# Patient Record
Sex: FEMALE | Race: BLACK OR AFRICAN AMERICAN
[De-identification: names, ages, dates, MRNs, and addresses within clinical notes are randomized per-mention and may not be internally consistent; named-entity substitution may affect disease eponyms.]

---

## 2020-09-14 ENCOUNTER — TRANSCRIPTION ENCOUNTER (OUTPATIENT)
Age: 65
End: 2020-09-14

## 2020-12-02 DIAGNOSIS — Z01.818 ENCOUNTER FOR OTHER PREPROCEDURAL EXAMINATION: ICD-10-CM

## 2020-12-02 PROBLEM — Z00.00 ENCOUNTER FOR PREVENTIVE HEALTH EXAMINATION: Status: ACTIVE | Noted: 2020-12-02

## 2020-12-03 ENCOUNTER — APPOINTMENT (OUTPATIENT)
Dept: DISASTER EMERGENCY | Facility: CLINIC | Age: 65
End: 2020-12-03

## 2020-12-04 LAB — SARS-COV-2 N GENE NPH QL NAA+PROBE: NOT DETECTED

## 2021-03-20 ENCOUNTER — APPOINTMENT (OUTPATIENT)
Dept: DISASTER EMERGENCY | Facility: CLINIC | Age: 66
End: 2021-03-20

## 2021-03-21 LAB — SARS-COV-2 N GENE NPH QL NAA+PROBE: NOT DETECTED

## 2021-10-24 ENCOUNTER — APPOINTMENT (OUTPATIENT)
Dept: DISASTER EMERGENCY | Facility: CLINIC | Age: 66
End: 2021-10-24

## 2021-10-25 LAB — SARS-COV-2 N GENE NPH QL NAA+PROBE: NOT DETECTED

## 2022-04-07 ENCOUNTER — APPOINTMENT (OUTPATIENT)
Dept: MRI IMAGING | Facility: CLINIC | Age: 67
End: 2022-04-07
Payer: MEDICARE

## 2022-04-07 PROCEDURE — 73721 MRI JNT OF LWR EXTRE W/O DYE: CPT | Mod: RT,MH

## 2022-04-29 ENCOUNTER — APPOINTMENT (OUTPATIENT)
Dept: PAIN MANAGEMENT | Facility: CLINIC | Age: 67
End: 2022-04-29
Payer: MEDICARE

## 2022-04-29 PROCEDURE — 64484 NJX AA&/STRD TFRM EPI L/S EA: CPT

## 2022-04-29 PROCEDURE — 64483 NJX AA&/STRD TFRM EPI L/S 1: CPT

## 2022-04-29 NOTE — PROCEDURE
[FreeTextEntry3] : Date of Service: 04/29/2022 \par \par Account: [id number] \par \par Patient: MUSTAPHA MCKENZIE \par \par YOB: 1955 \par \par Age: 67 year \par \par Surgeon:                                                        Hira Zelaya M.D.\par \par Assistant:                                                       None.\par \par Pre-Operative Diagnosis:                            Lumbosacral Radiculitis (M54.17)     \par \par Post Operative Diagnosis:                          Lumbosacral Radiculitis (M54.17)\par \par Procedure:                                                     Right L4-5, L5-S1 transformational epidural steroid injection under fluoroscopic guidance. \par \par Anesthesia:                                                    MAC\par \par \par This procedure was carried out using fluoroscopic guidance.  The risks and benefits of the procedure were discussed extensively with the patient.  The consent of the patient was obtained and the following procedure was performed. The patient was placed in the prone position on the fluoroscopic table and the lumbar area was prepped and draped in a sterile fashion.\par \par The right L4-5 and L5-S1 neural foramen were identified on right oblique  "gus dog" anatomical view at the 6 o' clock position using fluoroscopic guidance, and the area was marked. The overlying skin and subcutaneous structures were anesthetized using sterile technique with 1% Lidocaine.  A 22 gauge spinal needle was directed toward the inferior (6 o'clock) position of the pedicle, which formed the roof of the identified foramen.  Once in the epidural space, after negative aspiration for heme and CSF, 1cc of Omnipaque contrast was injected to confirm epidural location and assess filling defects and rule out intravascular needle placement. \par \par The following contrast flow and epidurogram was observed: no intravascular or intrathecal flow pattern was noted.  No blood or CSF was aspirated. Omnipaque spread appeared to outline the right L4 and L5 nerve roots and spread medially into the epidural space.  \par \par After this, an injectate of 3 cc preservative free normal saline plus 40 mg of depo-medrol was injected in the epidural space at each of the two levels.\par \par The needle was subsequently removed.  Vital signs remained normal.  Pulse oximeter was used throughout the procedure and the patient's pulse and oxygen saturation remained within normal limits.  The patient tolerated the procedure well.  There were no complications.  The patient was instructed to apply ice over the injection sites for twenty minutes every two hours for the next 24 to 48 hours.  The patient was also instructed to contact me immediately if there were any problems.\par \nubia Zelaya M.D.\par

## 2022-05-13 ENCOUNTER — APPOINTMENT (OUTPATIENT)
Dept: PAIN MANAGEMENT | Facility: CLINIC | Age: 67
End: 2022-05-13
Payer: MEDICARE

## 2022-05-13 VITALS — HEIGHT: 62 IN | BODY MASS INDEX: 36.44 KG/M2 | WEIGHT: 198 LBS

## 2022-05-13 PROCEDURE — 99214 OFFICE O/P EST MOD 30 MIN: CPT

## 2022-05-13 NOTE — HISTORY OF PRESENT ILLNESS
[Lower back] : lower back [Dull/Aching] : dull/aching [Sharp] : sharp [Shooting] : shooting [Frequent] : frequent [Household chores] : household chores [Social interactions] : social interactions [Meds] : meds [Ice] : ice [Injection therapy] : injection therapy [Walking] : walking [Exercising] : exercising [Stairs] : stairs [] : no

## 2022-05-13 NOTE — PROCEDURE
[FreeTextEntry3] : Date of Service: 04/29/2022 \par \par Account: [id number] \par \par Patient: MUSTAPHA MCKENZIE \par \par YOB: 1955 \par \par Age: 67 year \par \par Surgeon:                                                        Hira Zelaya M.D.\par \par Assistant:                                                       None.\par \par Pre-Operative Diagnosis:                            Lumbosacral Radiculitis (M54.17)     \par \par Post Operative Diagnosis:                          Lumbosacral Radiculitis (M54.17)\par \par Procedure:                                                     Right L4-5, L5-S1 transformational epidural steroid injection under fluoroscopic guidance. \par \par Anesthesia:                                                    MAC\par \par \par This procedure was carried out using fluoroscopic guidance.  The risks and benefits of the procedure were discussed extensively with the patient.  The consent of the patient was obtained and the following procedure was performed. The patient was placed in the prone position on the fluoroscopic table and the lumbar area was prepped and draped in a sterile fashion.\par \par The right L4-5 and L5-S1 neural foramen were identified on right oblique  "gus dog" anatomical view at the 6 o' clock position using fluoroscopic guidance, and the area was marked. The overlying skin and subcutaneous structures were anesthetized using sterile technique with 1% Lidocaine.  A 22 gauge spinal needle was directed toward the inferior (6 o'clock) position of the pedicle, which formed the roof of the identified foramen.  Once in the epidural space, after negative aspiration for heme and CSF, 1cc of Omnipaque contrast was injected to confirm epidural location and assess filling defects and rule out intravascular needle placement. \par \par The following contrast flow and epidurogram was observed: no intravascular or intrathecal flow pattern was noted.  No blood or CSF was aspirated. Omnipaque spread appeared to outline the right L4 and L5 nerve roots and spread medially into the epidural space.  \par \par After this, an injectate of 3 cc preservative free normal saline plus 40 mg of depo-medrol was injected in the epidural space at each of the two levels.\par \par The needle was subsequently removed.  Vital signs remained normal.  Pulse oximeter was used throughout the procedure and the patient's pulse and oxygen saturation remained within normal limits.  The patient tolerated the procedure well.  There were no complications.  The patient was instructed to apply ice over the injection sites for twenty minutes every two hours for the next 24 to 48 hours.  The patient was also instructed to contact me immediately if there were any problems.\par \nubia Zelaya M.D.\par

## 2022-05-13 NOTE — ASSESSMENT
[FreeTextEntry1] : 60% relief with TFESI with improved ROM and ADLS recommend repeat for cumulative relief

## 2022-06-23 ENCOUNTER — APPOINTMENT (OUTPATIENT)
Dept: PAIN MANAGEMENT | Facility: CLINIC | Age: 67
End: 2022-06-23

## 2022-06-23 PROCEDURE — 64483 NJX AA&/STRD TFRM EPI L/S 1: CPT | Mod: RT

## 2022-06-23 PROCEDURE — 64484 NJX AA&/STRD TFRM EPI L/S EA: CPT | Mod: RT

## 2022-06-23 NOTE — PROCEDURE
[FreeTextEntry3] : Date of Service: 06/23/2022 \par \par Account: [id number] \par \par Patient: MUSTAPHA MCKENZIE \par \par YOB: 1955 \par \par Age: 67 year \par \par Surgeon:                                                        Hira Zelaya M.D.\par \par Assistant:                                                       None.\par \par Pre-Operative Diagnosis:                            Lumbosacral Radiculitis (M54.17)     \par \par Post Operative Diagnosis:                          Lumbosacral Radiculitis (M54.17)\par \par Procedure:                                                     Right L4-5, L5-S1 transformational epidural steroid injection under fluoroscopic guidance. \par \par Anesthesia:                                                    MAC\par \par \par This procedure was carried out using fluoroscopic guidance.  The risks and benefits of the procedure were discussed extensively with the patient.  The consent of the patient was obtained and the following procedure was performed. The patient was placed in the prone position on the fluoroscopic table and the lumbar area was prepped and draped in a sterile fashion.\par \par The right L4-5 and L5-S1 neural foramen were identified on right oblique  "gus dog" anatomical view at the 6 o' clock position using fluoroscopic guidance, and the area was marked. The overlying skin and subcutaneous structures were anesthetized using sterile technique with 1% Lidocaine.  A 22 gauge spinal needle was directed toward the inferior (6 o'clock) position of the pedicle, which formed the roof of the identified foramen.  Once in the epidural space, after negative aspiration for heme and CSF, 1cc of Omnipaque contrast was injected to confirm epidural location and assess filling defects and rule out intravascular needle placement. \par \par The following contrast flow and epidurogram was observed: no intravascular or intrathecal flow pattern was noted.  No blood or CSF was aspirated. Omnipaque spread appeared to outline the right L4 and L5 nerve roots and spread medially into the epidural space.  \par \par After this, an injectate of 3 cc preservative free normal saline plus 40 mg of depo-medrol was injected in the epidural space at each of the two levels.\par \par The needle was subsequently removed.  Vital signs remained normal.  Pulse oximeter was used throughout the procedure and the patient's pulse and oxygen saturation remained within normal limits.  The patient tolerated the procedure well.  There were no complications.  The patient was instructed to apply ice over the injection sites for twenty minutes every two hours for the next 24 to 48 hours.  The patient was also instructed to contact me immediately if there were any problems.\par \nubia Zelaya M.D.\par

## 2022-07-08 ENCOUNTER — APPOINTMENT (OUTPATIENT)
Dept: PAIN MANAGEMENT | Facility: CLINIC | Age: 67
End: 2022-07-08

## 2022-07-08 VITALS — HEIGHT: 62 IN | BODY MASS INDEX: 37.36 KG/M2 | WEIGHT: 203 LBS

## 2022-07-08 PROCEDURE — 99214 OFFICE O/P EST MOD 30 MIN: CPT

## 2022-07-08 NOTE — HISTORY OF PRESENT ILLNESS
[Lower back] : lower back [Dull/Aching] : dull/aching [Household chores] : household chores [Social interactions] : social interactions [Rest] : rest [Meds] : meds [Injection therapy] : injection therapy [Walking] : walking [Bending forward] : bending forward [Exercising] : exercising [Stairs] : stairs [] : no

## 2022-07-08 NOTE — DISCUSSION/SUMMARY
[Surgical risks reviewed] : Surgical risks reviewed [de-identified] : Her current pain is worse with standing and sitting, improves with walking but then after significant distance ( 1 block or so) then she starts to have worsening pain. Denies groin pain. \par \par After discussing various treatment options with the patient including but not limited to oral medications, physical therapy, exercise,\par modalities as well as interventional spinal injections, we have decided with the following plan\par \par I personally reviewed the MRI/CT scan images and agree with the radiologist's report. The\par radiological findings were discussed with the patient.\par \par \par The risks, benefits, contents and alternatives to injection were explained in full to the patient. Risks outlined include but are not\par limited to infection,sepsis, bleeding, post-dural puncture headache, nerve damage, temporary increase in pain, syncopal episode,\par failure to resolve symptoms, allergic reaction, symptom recurrence, and elevation of blood sugar in diabetics. Cortisone may cause\par immunosuppression. Patient understands the risks. All questions were answered. After discussion of options, patient requested\par an injection. Information regarding the injection was given to the patient. Which medications to stop prior to the injection was\par explained to the patient as well.\par \par Follow up in 1-2 weeks post injection for re-evaluation.\par \par  Conservative Care\par Continue Home exercises, stretching, activity modification, physical therapy, and conservative care.\par \par Proceed with R 4/5 TFESI and consider surgical consult if no relief. \par

## 2022-07-08 NOTE — ASSESSMENT
[FreeTextEntry1] : Pt reports approx. 80% relief from injection, she still has some anterior thigh pain, DVT she states was ruled out. She is walking better and able to function far better than prior to injection.

## 2022-07-08 NOTE — PHYSICAL EXAM
[Normal Coordination] : normal coordination [Normal DTR UE/LE] : normal DTR UE/LE  [Normal Sensation] : normal sensation [Normal Mood and Affect] : normal mood and affect [Orientated] : orientated [Able to Communicate] : able to communicate [Normal Skin] : normal skin [No Rash] : no rash [No Ulcers] : no ulcers [No Lesions] : no lesions [No obvious lymphadenopathy in areas examined] : no obvious lymphadenopathy in areas examined [Well Developed] : well developed [Well Nourished] : well nourished [No Respiratory Distress] : no respiratory distress [] : light touch intact throughout both lower extremities

## 2022-07-26 LAB — SARS-COV-2 N GENE NPH QL NAA+PROBE: NOT DETECTED

## 2022-07-27 ENCOUNTER — OFFICE (OUTPATIENT)
Dept: URBAN - METROPOLITAN AREA CLINIC 12 | Facility: CLINIC | Age: 67
Setting detail: OPHTHALMOLOGY
End: 2022-07-27
Payer: MEDICARE

## 2022-07-27 DIAGNOSIS — H43.822: ICD-10-CM

## 2022-07-27 DIAGNOSIS — E11.9: ICD-10-CM

## 2022-07-27 DIAGNOSIS — H25.13: ICD-10-CM

## 2022-07-27 PROCEDURE — 92134 CPTRZ OPH DX IMG PST SGM RTA: CPT | Performed by: OPHTHALMOLOGY

## 2022-07-27 PROCEDURE — 99204 OFFICE O/P NEW MOD 45 MIN: CPT | Performed by: OPHTHALMOLOGY

## 2022-07-27 ASSESSMENT — KERATOMETRY
OD_K2POWER_DIOPTERS: 42.50
OD_K1POWER_DIOPTERS: 42.25
OS_K1POWER_DIOPTERS: 42.00
OS_AXISANGLE_DEGREES: 11
OD_AXISANGLE_DEGREES: 116
OS_K2POWER_DIOPTERS: 42.25

## 2022-07-27 ASSESSMENT — AXIALLENGTH_DERIVED
OS_AL: 24.9501
OD_AL: 25.4001
OD_AL: 25.4001
OS_AL: 24.9501

## 2022-07-27 ASSESSMENT — CONFRONTATIONAL VISUAL FIELD TEST (CVF)
OD_FINDINGS: FULL
OS_FINDINGS: FULL

## 2022-07-27 ASSESSMENT — REFRACTION_MANIFEST
OS_VA1: 20/30-2
OS_SPHERE: -1.25
OD_AXIS: 96
OS_AXIS: 80
OD_SPHERE: -3.00
OS_CYLINDER: -1.50
OD_VA1: 20/40-2
OD_CYLINDER: -0.50

## 2022-07-27 ASSESSMENT — REFRACTION_CURRENTRX
OD_CYLINDER: -0.50
OS_VPRISM_DIRECTION: BF
OS_OVR_VA: 20/
OD_SPHERE: -0.75
OS_AXIS: 94
OD_OVR_VA: 20/
OS_SPHERE: -0.50
OS_CYLINDER: -0.75
OD_ADD: +2.00
OD_AXIS: 106
OS_ADD: +2.00
OD_VPRISM_DIRECTION: BF

## 2022-07-27 ASSESSMENT — SPHEQUIV_DERIVED
OS_SPHEQUIV: -2
OD_SPHEQUIV: -3.25
OS_SPHEQUIV: -2
OD_SPHEQUIV: -3.25

## 2022-07-27 ASSESSMENT — VISUAL ACUITY
OD_BCVA: 20/70
OS_BCVA: 20/150

## 2022-07-27 ASSESSMENT — REFRACTION_AUTOREFRACTION
OD_CYLINDER: -0.50
OD_AXIS: 96
OS_AXIS: 80
OS_CYLINDER: -1.50
OD_SPHERE: -3.00
OS_SPHERE: -1.25

## 2022-07-27 ASSESSMENT — TONOMETRY
OS_IOP_MMHG: 17
OD_IOP_MMHG: 17

## 2022-07-29 ENCOUNTER — APPOINTMENT (OUTPATIENT)
Dept: PAIN MANAGEMENT | Facility: CLINIC | Age: 67
End: 2022-07-29

## 2022-07-29 PROCEDURE — 64483 NJX AA&/STRD TFRM EPI L/S 1: CPT | Mod: RT

## 2022-07-29 NOTE — PROCEDURE
[FreeTextEntry3] : Date of Service: 07/29/2022 \par \par Account: [id number] \par \par Patient: MUSTAPHA MCKENZIE \par \par YOB: 1955 \par \par Age: 67 year \par \par Surgeon:                                                         Hira Zelaya M.D.\par \par Assistant:                                                        None.\par \par Pre-Operative Diagnosis:                             Lumbosacral Radiculitis (M54.17)\par \par Post Operative Diagnosis:                            Lumbosacral Radiculitis (M54.17)\par \par Procedure:                                                       Right L4-5 transforaminal epidural steroid injection under fluoroscopic guidance. \par \par Anesthesia:                                                      MAC\par \par \par This procedure was carried out using fluoroscopic guidance.  The risks and benefits of the procedure were discussed extensively with the patient.  The consent of the patient was obtained and the following procedure was performed. The patient was placed in the prone position on the fluoroscopic table and the lumbar area was prepped and draped in a sterile fashion.\par \par The right L4-5 neural foramen was identified on right oblique  "gus dog" anatomical view at the 6 o' clock position using fluoroscopic guidance, and the area was marked. The overlying skin and subcutaneous structures were anesthetized using sterile technique with 1% Lidocaine.  A 22 gauge spinal needle was directed toward the inferior (6o'clock) position of the pedicle, which formed the roof of the identified foramen.  Once in the epidural space, after negative aspiration for heme and CSF, 1cc of Omnipaque contrast was injected to confirm epidural location and assess filling defects and rule out intravascular needle placement. \par \par The following contrast flow and epidurogram was observed: no intravascular or intrathecal flow pattern was noted.  No blood or CSF was aspirated. Omnipaque spread appeared to outline the right L4 nerve root and spread medially into the epidural space.  \par \par  After this, an injectate of 3 cc preservative free normal saline plus 80 mg of depo-medrol was injected in the epidural space at that level.\par \par The needle was subsequently removed.  Vital signs remained normal.  Pulse oximeter was used throughout the procedure and the patient's pulse and oxygen saturation remained within normal limits.  The patient tolerated the procedure well.  There were no complications.  The patient was instructed to apply ice over the injection sites for twenty minutes every two hours for the next 24 to 48 hours.  The patient was also instructed to contact me immediately if there were any problems.\par \nubia Zelaya M.D.\par

## 2022-08-12 ENCOUNTER — APPOINTMENT (OUTPATIENT)
Dept: PAIN MANAGEMENT | Facility: CLINIC | Age: 67
End: 2022-08-12

## 2022-08-12 VITALS — BODY MASS INDEX: 37.91 KG/M2 | WEIGHT: 206 LBS | HEIGHT: 62 IN

## 2022-08-12 DIAGNOSIS — G89.29 LOW BACK PAIN, UNSPECIFIED: ICD-10-CM

## 2022-08-12 DIAGNOSIS — M54.50 LOW BACK PAIN, UNSPECIFIED: ICD-10-CM

## 2022-08-12 DIAGNOSIS — M54.12 RADICULOPATHY, CERVICAL REGION: ICD-10-CM

## 2022-08-12 DIAGNOSIS — M54.16 RADICULOPATHY, LUMBAR REGION: ICD-10-CM

## 2022-08-12 DIAGNOSIS — M54.2 CERVICALGIA: ICD-10-CM

## 2022-08-12 DIAGNOSIS — M54.17 RADICULOPATHY, LUMBOSACRAL REGION: ICD-10-CM

## 2022-08-12 PROCEDURE — 99214 OFFICE O/P EST MOD 30 MIN: CPT

## 2022-08-12 NOTE — ASSESSMENT
[FreeTextEntry1] : S/P R L4/5 TFESI. Pt reports approx. 50-60% relief will proceed with repeat injection and continue HEP. Will change steroid to triamcinolone at next procedure.

## 2022-08-12 NOTE — PHYSICAL EXAM
[Normal Coordination] : normal coordination [Normal DTR UE/LE] : normal DTR UE/LE  [Normal Sensation] : normal sensation [Normal Mood and Affect] : normal mood and affect [Able to Communicate] : able to communicate [Normal Skin] : normal skin [No Rash] : no rash [No Ulcers] : no ulcers [No Lesions] : no lesions [No obvious lymphadenopathy in areas examined] : no obvious lymphadenopathy in areas examined [Well Developed] : well developed [Well Nourished] : well nourished [No Respiratory Distress] : no respiratory distress [] : light touch intact throughout both lower extremities

## 2022-08-12 NOTE — DISCUSSION/SUMMARY
[Surgical risks reviewed] : Surgical risks reviewed [de-identified] : After discussing various treatment options with the patient including but not limited to oral medications, physical therapy, exercise,\par modalities as well as interventional spinal injections, we have decided with the following plan\par \par I personally reviewed the MRI/CT scan images and agree with the radiologist's report. The\par radiological findings were discussed with the patient.\par \par \par The risks, benefits, contents and alternatives to injection were explained in full to the patient. Risks outlined include but are not\par limited to infection,sepsis, bleeding, post-dural puncture headache, nerve damage, temporary increase in pain, syncopal episode,\par failure to resolve symptoms, allergic reaction, symptom recurrence, and elevation of blood sugar in diabetics. Cortisone may cause\par immunosuppression. Patient understands the risks. All questions were answered. After discussion of options, patient requested\par an injection. Information regarding the injection was given to the patient. Which medications to stop prior to the injection was\par explained to the patient as well.\par \par Follow up in 1-2 weeks post injection for re-evaluation.\par \par  Conservative Care\par Continue Home exercises, stretching, activity modification, physical therapy, and conservative care.\par \par Proceed with R L4/5 TFESI with triamcinolone\par \par \par Still with some groin pain, R hip MRI done, will obtain surgical input. \par \par

## 2022-08-12 NOTE — HISTORY OF PRESENT ILLNESS
[Lower back] : lower back [Frequent] : frequent [Leisure] : leisure [Social interactions] : social interactions [Rest] : rest [Injection therapy] : injection therapy [Walking] : walking [Bending forward] : bending forward [Exercising] : exercising [Stairs] : stairs [] : no

## 2022-09-14 ENCOUNTER — OFFICE (OUTPATIENT)
Dept: URBAN - METROPOLITAN AREA CLINIC 12 | Facility: CLINIC | Age: 67
Setting detail: OPHTHALMOLOGY
End: 2022-09-14
Payer: MEDICARE

## 2022-09-14 DIAGNOSIS — H25.11: ICD-10-CM

## 2022-09-14 DIAGNOSIS — H25.13: ICD-10-CM

## 2022-09-14 PROCEDURE — 92136 OPHTHALMIC BIOMETRY: CPT | Performed by: OPHTHALMOLOGY

## 2022-09-14 PROCEDURE — 99213 OFFICE O/P EST LOW 20 MIN: CPT | Performed by: OPHTHALMOLOGY

## 2022-09-14 ASSESSMENT — TONOMETRY
OD_IOP_MMHG: 15
OS_IOP_MMHG: 15

## 2022-09-14 ASSESSMENT — KERATOMETRY
METHOD_AUTO_MANUAL: AUTO
OS_AXISANGLE_DEGREES: 057
OS_K2POWER_DIOPTERS: 43.00
OD_K2POWER_DIOPTERS: 42.50
OS_K1POWER_DIOPTERS: 42.50
OD_K1POWER_DIOPTERS: 42.25
OD_AXISANGLE_DEGREES: 098

## 2022-09-14 ASSESSMENT — SPHEQUIV_DERIVED
OS_SPHEQUIV: -3.375
OD_SPHEQUIV: -3.25
OS_SPHEQUIV: -2
OD_SPHEQUIV: -3.25

## 2022-09-14 ASSESSMENT — REFRACTION_CURRENTRX
OD_ADD: +2.00
OD_AXIS: 106
OS_VPRISM_DIRECTION: BF
OD_CYLINDER: -0.50
OD_SPHERE: -0.75
OD_VPRISM_DIRECTION: BF
OS_SPHERE: -0.50
OS_CYLINDER: -0.75
OD_OVR_VA: 20/
OS_OVR_VA: 20/
OS_AXIS: 94
OS_ADD: +2.00

## 2022-09-14 ASSESSMENT — REFRACTION_MANIFEST
OD_CYLINDER: -0.50
OS_VA1: 20/30-2
OD_AXIS: 96
OS_CYLINDER: -1.50
OD_VA1: 20/40-2
OD_SPHERE: -3.00
OS_SPHERE: -1.25
OS_AXIS: 80

## 2022-09-14 ASSESSMENT — REFRACTION_AUTOREFRACTION
OS_SPHERE: -2.75
OS_AXIS: 009
OS_CYLINDER: -1.25
OD_CYLINDER: -1.00
OD_SPHERE: -2.75
OD_AXIS: 100

## 2022-09-14 ASSESSMENT — CONFRONTATIONAL VISUAL FIELD TEST (CVF)
OS_FINDINGS: FULL
OD_FINDINGS: FULL

## 2022-09-14 ASSESSMENT — VISUAL ACUITY
OD_BCVA: 20/80
OS_BCVA: 20/150

## 2022-09-14 ASSESSMENT — AXIALLENGTH_DERIVED
OD_AL: 25.4001
OS_AL: 25.2973
OD_AL: 25.4001
OS_AL: 24.696

## 2022-09-26 ENCOUNTER — AMBULATORY SURGERY CENTER (OUTPATIENT)
Dept: URBAN - METROPOLITAN AREA SURGERY 27 | Facility: SURGERY | Age: 67
Setting detail: OPHTHALMOLOGY
End: 2022-09-26
Payer: MEDICARE

## 2022-09-26 DIAGNOSIS — H25.11: ICD-10-CM

## 2022-09-26 PROCEDURE — 68841 INSJ RX ELUT IMPLT LAC CANAL: CPT | Performed by: OPHTHALMOLOGY

## 2022-09-26 PROCEDURE — 66984 XCAPSL CTRC RMVL W/O ECP: CPT | Performed by: OPHTHALMOLOGY

## 2022-09-27 ENCOUNTER — OFFICE (OUTPATIENT)
Dept: URBAN - METROPOLITAN AREA CLINIC 12 | Facility: CLINIC | Age: 67
Setting detail: OPHTHALMOLOGY
End: 2022-09-27
Payer: MEDICARE

## 2022-09-27 ENCOUNTER — RX ONLY (RX ONLY)
Age: 67
End: 2022-09-27

## 2022-09-27 DIAGNOSIS — Z96.1: ICD-10-CM

## 2022-09-27 PROCEDURE — 99024 POSTOP FOLLOW-UP VISIT: CPT | Performed by: OPTOMETRIST

## 2022-09-27 ASSESSMENT — REFRACTION_MANIFEST
OS_AXIS: 80
OS_CYLINDER: -1.50
OS_VA1: 20/30-2
OD_CYLINDER: -0.50
OS_SPHERE: -1.25
OD_AXIS: 96
OD_SPHERE: -3.00
OD_VA1: 20/40-2

## 2022-09-27 ASSESSMENT — CONFRONTATIONAL VISUAL FIELD TEST (CVF)
OD_FINDINGS: FULL
OS_FINDINGS: FULL

## 2022-09-27 ASSESSMENT — REFRACTION_AUTOREFRACTION
OS_SPHERE: -1.75
OD_SPHERE: +0.50
OD_AXIS: 069
OS_CYLINDER: -1.00
OD_CYLINDER: -1.25
OS_AXIS: 082

## 2022-09-27 ASSESSMENT — AXIALLENGTH_DERIVED
OS_AL: 24.8478
OD_AL: 25.5329
OD_AL: 27.0433
OS_AL: 24.9563

## 2022-09-27 ASSESSMENT — KERATOMETRY
OD_K1POWER_DIOPTERS: 33.50
OS_AXISANGLE_DEGREES: 169
OD_K2POWER_DIOPTERS: 44.00
METHOD_AUTO_MANUAL: AUTO
OD_AXISANGLE_DEGREES: 092
OS_K2POWER_DIOPTERS: 42.50
OS_K1POWER_DIOPTERS: 42.25

## 2022-09-27 ASSESSMENT — TONOMETRY
OD_IOP_MMHG: 17
OS_IOP_MMHG: 17

## 2022-09-27 ASSESSMENT — REFRACTION_CURRENTRX
OS_VPRISM_DIRECTION: BF
OD_ADD: +2.00
OD_AXIS: 105
OD_CYLINDER: -0.50
OS_OVR_VA: 20/
OS_ADD: +2.00
OS_SPHERE: -0.50
OD_OVR_VA: 20/
OD_SPHERE: -0.75
OS_AXIS: 095
OD_VPRISM_DIRECTION: BF
OS_CYLINDER: -0.75

## 2022-09-27 ASSESSMENT — SPHEQUIV_DERIVED
OS_SPHEQUIV: -2.25
OD_SPHEQUIV: -3.25
OD_SPHEQUIV: -0.125
OS_SPHEQUIV: -2

## 2022-09-27 ASSESSMENT — VISUAL ACUITY
OD_BCVA: 20/70-
OS_BCVA: 20/50

## 2022-10-05 ENCOUNTER — OFFICE (OUTPATIENT)
Dept: URBAN - METROPOLITAN AREA CLINIC 12 | Facility: CLINIC | Age: 67
Setting detail: OPHTHALMOLOGY
End: 2022-10-05
Payer: MEDICARE

## 2022-10-05 DIAGNOSIS — H25.12: ICD-10-CM

## 2022-10-05 PROCEDURE — 92136 OPHTHALMIC BIOMETRY: CPT | Performed by: OPHTHALMOLOGY

## 2022-10-05 ASSESSMENT — SPHEQUIV_DERIVED
OS_SPHEQUIV: -2.25
OD_SPHEQUIV: -3.25
OD_SPHEQUIV: 0.125
OS_SPHEQUIV: -2

## 2022-10-05 ASSESSMENT — REFRACTION_CURRENTRX
OD_AXIS: 105
OS_OVR_VA: 20/
OD_CYLINDER: -0.50
OS_SPHERE: -0.50
OD_VPRISM_DIRECTION: BF
OS_VPRISM_DIRECTION: BF
OS_ADD: +2.00
OD_ADD: +2.00
OS_CYLINDER: -0.75
OD_OVR_VA: 20/
OD_SPHERE: -0.75
OS_AXIS: 077

## 2022-10-05 ASSESSMENT — REFRACTION_MANIFEST
OD_VA1: 20/40-2
OD_SPHERE: -3.00
OS_AXIS: 80
OS_CYLINDER: -1.50
OD_AXIS: 96
OD_CYLINDER: -0.50
OS_VA1: 20/30-2
OS_SPHERE: -1.25

## 2022-10-05 ASSESSMENT — KERATOMETRY
OD_K2POWER_DIOPTERS: 42.25
OD_K1POWER_DIOPTERS: 41.75
METHOD_AUTO_MANUAL: AUTO
OS_AXISANGLE_DEGREES: 090
OS_K1POWER_DIOPTERS: 42.25
OD_AXISANGLE_DEGREES: 154
OS_K2POWER_DIOPTERS: 42.25

## 2022-10-05 ASSESSMENT — VISUAL ACUITY
OD_BCVA: 20/60-1
OS_BCVA: 20/25-2

## 2022-10-05 ASSESSMENT — REFRACTION_AUTOREFRACTION
OS_SPHERE: -1.75
OS_CYLINDER: -1.00
OD_CYLINDER: -0.25
OS_AXIS: 080
OD_SPHERE: +0.25
OD_AXIS: 075

## 2022-10-05 ASSESSMENT — AXIALLENGTH_DERIVED
OS_AL: 25.0078
OS_AL: 24.8989
OD_AL: 25.5607
OD_AL: 24.1051

## 2022-10-05 ASSESSMENT — CONFRONTATIONAL VISUAL FIELD TEST (CVF)
OS_FINDINGS: FULL
OD_FINDINGS: FULL

## 2022-10-05 ASSESSMENT — TONOMETRY
OD_IOP_MMHG: 19
OS_IOP_MMHG: 21

## 2022-10-18 ENCOUNTER — AMBULATORY SURGERY CENTER (OUTPATIENT)
Dept: URBAN - METROPOLITAN AREA SURGERY 27 | Facility: SURGERY | Age: 67
Setting detail: OPHTHALMOLOGY
End: 2022-10-18
Payer: MEDICARE

## 2022-10-18 DIAGNOSIS — H25.12: ICD-10-CM

## 2022-10-18 PROCEDURE — 66984 XCAPSL CTRC RMVL W/O ECP: CPT | Performed by: OPHTHALMOLOGY

## 2022-10-18 PROCEDURE — 68841 INSJ RX ELUT IMPLT LAC CANAL: CPT | Performed by: OPHTHALMOLOGY

## 2022-10-19 ENCOUNTER — OFFICE (OUTPATIENT)
Dept: URBAN - METROPOLITAN AREA CLINIC 12 | Facility: CLINIC | Age: 67
Setting detail: OPHTHALMOLOGY
End: 2022-10-19
Payer: MEDICARE

## 2022-10-19 ENCOUNTER — RX ONLY (RX ONLY)
Age: 67
End: 2022-10-19

## 2022-10-19 DIAGNOSIS — Z96.1: ICD-10-CM

## 2022-10-19 PROBLEM — E11.9 DIABETES TYPE 2 NO RETINOPATHY ; BOTH EYES: Status: ACTIVE | Noted: 2022-07-27

## 2022-10-19 PROBLEM — H43.822 VITREOMACULAR ADHESION ; LEFT EYE: Status: ACTIVE | Noted: 2022-07-27

## 2022-10-19 PROCEDURE — 99024 POSTOP FOLLOW-UP VISIT: CPT | Performed by: OPTOMETRIST

## 2022-10-19 ASSESSMENT — REFRACTION_AUTOREFRACTION
OD_CYLINDER: -0.25
OD_SPHERE: +0.25
OS_CYLINDER: -0.25
OS_AXIS: 083
OS_SPHERE: PLANO
OD_AXIS: 147

## 2022-10-19 ASSESSMENT — SPHEQUIV_DERIVED
OS_SPHEQUIV: -2
OD_SPHEQUIV: -3.25
OD_SPHEQUIV: 0.125

## 2022-10-19 ASSESSMENT — VISUAL ACUITY
OS_BCVA: 20/20-2
OD_BCVA: 20/25

## 2022-10-19 ASSESSMENT — REFRACTION_CURRENTRX
OD_SPHERE: -0.75
OS_VPRISM_DIRECTION: BF
OD_ADD: +2.00
OS_ADD: +2.00
OS_SPHERE: -0.50
OD_CYLINDER: -0.50
OD_VPRISM_DIRECTION: BF
OS_AXIS: 077
OD_OVR_VA: 20/
OS_CYLINDER: -0.75
OD_AXIS: 105
OS_OVR_VA: 20/

## 2022-10-19 ASSESSMENT — REFRACTION_MANIFEST
OD_SPHERE: -3.00
OS_VA1: 20/30-2
OS_CYLINDER: -1.50
OS_SPHERE: -1.25
OD_AXIS: 96
OS_AXIS: 80
OD_VA1: 20/40-2
OD_CYLINDER: -0.50

## 2022-10-19 ASSESSMENT — CONFRONTATIONAL VISUAL FIELD TEST (CVF)
OS_FINDINGS: FULL
OD_FINDINGS: FULL

## 2022-10-19 ASSESSMENT — KERATOMETRY
OS_AXISANGLE_DEGREES: 008
OD_AXISANGLE_DEGREES: 106
OS_K1POWER_DIOPTERS: 42.50
METHOD_AUTO_MANUAL: AUTO
OD_K2POWER_DIOPTERS: 42.25
OS_K2POWER_DIOPTERS: 42.75
OD_K1POWER_DIOPTERS: 41.75

## 2022-10-19 ASSESSMENT — AXIALLENGTH_DERIVED
OD_AL: 24.1051
OS_AL: 24.7464
OD_AL: 25.5607

## 2022-10-19 ASSESSMENT — TONOMETRY: OD_IOP_MMHG: 20

## 2022-11-17 ENCOUNTER — APPOINTMENT (OUTPATIENT)
Dept: PAIN MANAGEMENT | Facility: CLINIC | Age: 67
End: 2022-11-17

## 2022-12-02 ENCOUNTER — APPOINTMENT (OUTPATIENT)
Dept: PAIN MANAGEMENT | Facility: CLINIC | Age: 67
End: 2022-12-02

## 2022-12-16 ENCOUNTER — APPOINTMENT (OUTPATIENT)
Dept: PAIN MANAGEMENT | Facility: CLINIC | Age: 67
End: 2022-12-16